# Patient Record
Sex: MALE | NOT HISPANIC OR LATINO | Employment: FULL TIME | URBAN - METROPOLITAN AREA
[De-identification: names, ages, dates, MRNs, and addresses within clinical notes are randomized per-mention and may not be internally consistent; named-entity substitution may affect disease eponyms.]

---

## 2022-01-01 ENCOUNTER — HOSPITAL ENCOUNTER (EMERGENCY)
Facility: HOSPITAL | Age: 42
Discharge: HOME | End: 2022-01-01
Payer: COMMERCIAL

## 2022-01-01 VITALS
DIASTOLIC BLOOD PRESSURE: 88 MMHG | WEIGHT: 208 LBS | HEIGHT: 73 IN | OXYGEN SATURATION: 97 % | HEART RATE: 89 BPM | RESPIRATION RATE: 18 BRPM | SYSTOLIC BLOOD PRESSURE: 146 MMHG | TEMPERATURE: 98 F | BODY MASS INDEX: 27.57 KG/M2

## 2022-01-01 DIAGNOSIS — R20.2 PARESTHESIAS: Primary | ICD-10-CM

## 2022-01-01 PROCEDURE — 99282 EMERGENCY DEPT VISIT SF MDM: CPT

## 2022-01-01 NOTE — ED TRIAGE NOTES
Patient ambulatory to triage with steady gait. C/o numbness to fingers and to feet ongoing x a few weeks. Patient reports it has been worsening and pain is increased when he wears socks and shoes. Denies any recent trauma/injury. Also c/o pressure to back of neck and reports has been going to a chiropractor without relief of symptoms. VSS. Able to move all extremities while in triage. Denies any loss of bowel/bladder.

## 2022-01-01 NOTE — ED PROVIDER NOTES
"  Please note that my documentation in this Electronic Healthcare Record was produced using speech recognition software and therefore may contain errors related to that software.These could include grammar, punctuation and spelling errors or the inclusion/ exclusion of phrases that were not intended. Please contact myself for any clarification, questions or concerns.    HPI: Patient is a 41 y.o. male who presents with the chief complaint of paresthesias of the hands and feet X 1-2 weeks.  Patient feels as if his hands and feet are asleep.  He has been seen by his primary care and chiropractor.  Denies any recent falls or trauma.  Denies any chest pain, difficulty breathing, lightheadedness, dizziness, visual changes, extremity weakness.  Patient states that he feels his nerves burning on his upper and lower extremities.  Denies any neck or back pain.  Checked his glucose at home and was 98. No history of diabetes.      REVIEW OF SYSTEMS - 10 systems were independently reviewed and are otherwise negative with the exception of those items previously documented in the HPI and nursing notes.    Allergy: Patient has no known allergies.    Past medical history: History reviewed. No pertinent past medical history.    Surgical History:   Past Surgical History:   Procedure Laterality Date    KNEE SURGERY         Social history:   Social History     Tobacco Use    Smoking status: Current Some Day Smoker    Smokeless tobacco: Never Used   Substance and Sexual Activity    Alcohol use: Yes     Comment: occ    Drug use: Never       Family history: non-contributory    EHR: reviewed    Vitals: BP (!) 146/88 (BP Location: Left arm, Patient Position: Sitting)   Pulse 89   Temp 97.9 °F (36.6 °C) (Oral)   Resp 18   Ht 6' 1" (1.854 m)   Wt 94.3 kg (208 lb)   SpO2 97%   BMI 27.44 kg/m²     PHYSICAL EXAM:    General-41-year-old male awake and alert, oriented, GCS 15, in no acute distress,  HEENT- normocephalic, atraumatic, " sclera anicteric, moist mucous membranes, PERRL, EOMI  CARDIOVASCULAR- regular rate and rhythm  PULMONARY- nonlabored, no respiratory distress  NEUROLOGIC- mental status normal, speech fluid, cognition normal, CN II-XII grossly intact, sensations equal normal bilateral upper and lower extremities, peripheral pulse 2 +/4, ambulatory with proper gait.  MUSCULOSKELETAL- well-nourished, well-developed, neurovascular intact.  DERMATOLOGIC- warm and dry, no visible rashes  PSYCHIATRIC- normal affect, normal concentration           Labs Reviewed - No data to display    No orders to display       MEDICAL DECISION MAKING: Patient is a 41 y.o. male who presented with chief complaint of paresthesias of the hands and feet.  Denies any recent falls or trauma.  No neck or back pain, chest pain, difficulty breathing, lightheadedness, dizziness, extremity weakness.  Patient has been seen by primary care and orthopedic.  On examination, he is neuro intact with good peripheral pulse, sensation, cap refill, and range of motion.  No midline cervical, thoracic, or lumbar tenderness.  I did offer basic blood work to check for electrolyte abnormality.  Patient was hoping to have MRI, nerve conduction study, or have his vitamin levels checked today.  Advised do not perform these tests regularly.  He has declined the CBC and CMP at this time and will follow-up with his primary care.  Stated he will call them today.  Patient's vitals are stable and normal.  They will be discharged home in stable condition.  They verbalized their understanding and agreed to this plan.    CLINICAL IMPRESSION:  1. Paresthesias         DEANNA Adrian  01/01/22 1257

## 2024-08-19 ENCOUNTER — OFFICE VISIT (OUTPATIENT)
Dept: URGENT CARE | Facility: CLINIC | Age: 44
End: 2024-08-19
Payer: COMMERCIAL

## 2024-08-19 VITALS
DIASTOLIC BLOOD PRESSURE: 78 MMHG | WEIGHT: 210 LBS | SYSTOLIC BLOOD PRESSURE: 122 MMHG | BODY MASS INDEX: 27.83 KG/M2 | TEMPERATURE: 98 F | HEART RATE: 58 BPM | HEIGHT: 73 IN | RESPIRATION RATE: 17 BRPM | OXYGEN SATURATION: 97 %

## 2024-08-19 DIAGNOSIS — J98.8 VIRAL RESPIRATORY ILLNESS: Primary | ICD-10-CM

## 2024-08-19 DIAGNOSIS — B97.89 VIRAL RESPIRATORY ILLNESS: Primary | ICD-10-CM

## 2024-08-19 DIAGNOSIS — R09.81 SINUS CONGESTION: ICD-10-CM

## 2024-08-19 LAB
CTP QC/QA: YES
SARS-COV-2 AG RESP QL IA.RAPID: NEGATIVE

## 2024-08-19 PROCEDURE — 87811 SARS-COV-2 COVID19 W/OPTIC: CPT | Mod: QW,S$GLB,,

## 2024-08-19 PROCEDURE — 99203 OFFICE O/P NEW LOW 30 MIN: CPT | Mod: S$GLB,,,

## 2024-08-19 NOTE — PROGRESS NOTES
"Subjective:       Patient ID: Donny Lieberman is a 44 y.o. male.    Vitals:  height is 6' 1" (1.854 m) and weight is 95.3 kg (210 lb). His oral temperature is 97.5 °F (36.4 °C). His blood pressure is 122/78 and his pulse is 58 (abnormal). His respiration is 17 and oxygen saturation is 97%.     Chief Complaint: Sinus Problem    This is a 44 y.o. male who presents today with a chief complaint of  Patient presents with: fever (has not taken), muscle soreness, runny nose, and headache. Pt states that this morning he is feeling better but needs a work note for today.     Pt denies cough medication at this time.     Sinus Problem  This is a new problem. His pain is at a severity of 5/10. Associated symptoms include congestion, coughing, headaches and sinus pressure.       Constitution: Positive for fever.   HENT:  Positive for congestion, postnasal drip, sinus pain and sinus pressure.    Neck: neck negative.   Cardiovascular: Negative.    Eyes: Negative.    Respiratory:  Positive for cough.    Gastrointestinal: Negative.    Endocrine: negative.   Genitourinary: Negative.    Musculoskeletal:  Positive for muscle ache.   Skin: Negative.    Allergic/Immunologic: Negative.    Neurological:  Positive for headaches.   Hematologic/Lymphatic: Negative.    Psychiatric/Behavioral: Negative.             Objective:      Physical Exam   Constitutional: He is oriented to person, place, and time.   HENT:   Head: Normocephalic and atraumatic.   Ears:   Right Ear: Tympanic membrane, external ear and ear canal normal.   Left Ear: Tympanic membrane, external ear and ear canal normal.   Nose: Rhinorrhea and congestion present.   Mouth/Throat: Posterior oropharyngeal erythema present.   Eyes: Conjunctivae are normal. Pupils are equal, round, and reactive to light. Extraocular movement intact   Neck: Neck supple.   Cardiovascular: Normal rate, regular rhythm, normal heart sounds and normal pulses.   Pulmonary/Chest: Effort normal and breath " sounds normal.   Abdominal: Normal appearance.   Musculoskeletal: Normal range of motion.         General: Normal range of motion.   Neurological: no focal deficit. He is alert, oriented to person, place, and time and at baseline.   Skin: Skin is warm.   Psychiatric: His behavior is normal. Mood, judgment and thought content normal.   Nursing note and vitals reviewed.        Past medical history and current medications reviewed.       Assessment:           1. Viral respiratory illness    2. Sinus congestion          Results for orders placed or performed in visit on 08/19/24   SARS Coronavirus 2 Antigen, POCT Manual Read   Result Value Ref Range    SARS Coronavirus 2 Antigen Negative Negative     Acceptable Yes         Plan:         Viral respiratory illness    Sinus congestion  -     SARS Coronavirus 2 Antigen, POCT Manual Read             Patient Instructions   Please return here or go to the Emergency Department for any concerns or worsening of condition.  Please drink plenty of fluids.  Please get plenty of rest.  If you were prescribed antibiotics, please take them to completion.  If you were given wait & see antibiotics, please wait 5-7 days before taking them, and only take them if your symptoms have worsened or not improved.  If you do begin taking the antibiotics, please take them to completion.  If you were given a steroid shot in the clinic and have also been given a prescription for a steroid such as Prednisone or a Medrol Dose Pack, please begin taking them tomorrow.  If you do not have Hypertension or any history of palpitations, it is ok to take over the counter Sudafed or Mucinex D or Allegra-D or Claritin-D or Zyrtec-D.  If you do take one of the above, it is ok to combine that with plain over the counter Mucinex or Allegra or Claritin or Zyrtec.  If for example you are taking Zyrtec -D, you can combine that with Mucinex, but not Mucinex-D.  If you are taking Mucinex-D, you can  combine that with plain Allegra or Claritin or Zyrtec.   If you do have Hypertension or palpitations, it is safe to take Coricidin HBP for relief of sinus symptoms.  If not allergic, please take over the counter Tylenol (Acetaminophen) and/or Motrin (Ibuprofen) as directed for control of pain and/or fever.  Please follow up with your primary care doctor or specialist as needed.    If you  smoke, please stop smoking.

## 2024-08-19 NOTE — LETTER
August 19, 2024      [facility] - [address], [address]  [place] [address]  Phone: [phone]  Fax: [phone]       Patient: Donny Lieberman   YOB: 1980  Date of Visit: 08/19/2024    To Whom It May Concern:    Elliot Lieberman  was at [facility] on 08/19/2024. The patient may return to work/school on 08/21/2024 with no restrictions. If you have any questions or concerns, or if I can be of further assistance, please do not hesitate to contact me.    Sincerely,    Saranya Elliott, NP